# Patient Record
Sex: FEMALE | Race: BLACK OR AFRICAN AMERICAN | URBAN - METROPOLITAN AREA
[De-identification: names, ages, dates, MRNs, and addresses within clinical notes are randomized per-mention and may not be internally consistent; named-entity substitution may affect disease eponyms.]

---

## 2020-04-07 ENCOUNTER — VIRTUAL VISIT (OUTPATIENT)
Dept: INTERNAL MEDICINE CLINIC | Age: 40
End: 2020-04-07

## 2020-04-07 DIAGNOSIS — G89.29 CHRONIC BILATERAL LOW BACK PAIN WITH BILATERAL SCIATICA: ICD-10-CM

## 2020-04-07 DIAGNOSIS — M54.42 CHRONIC BILATERAL LOW BACK PAIN WITH BILATERAL SCIATICA: ICD-10-CM

## 2020-04-07 DIAGNOSIS — M77.12 LATERAL EPICONDYLITIS OF LEFT ELBOW: Primary | ICD-10-CM

## 2020-04-07 DIAGNOSIS — M54.41 CHRONIC BILATERAL LOW BACK PAIN WITH BILATERAL SCIATICA: ICD-10-CM

## 2020-04-07 DIAGNOSIS — L60.9 NAIL ABNORMALITY: ICD-10-CM

## 2020-04-08 ENCOUNTER — OFFICE VISIT (OUTPATIENT)
Dept: INTERNAL MEDICINE CLINIC | Age: 40
End: 2020-04-08

## 2020-04-08 VITALS
OXYGEN SATURATION: 98 % | RESPIRATION RATE: 16 BRPM | HEIGHT: 66 IN | TEMPERATURE: 98.1 F | SYSTOLIC BLOOD PRESSURE: 111 MMHG | HEART RATE: 66 BPM | WEIGHT: 210.6 LBS | DIASTOLIC BLOOD PRESSURE: 78 MMHG | BODY MASS INDEX: 33.85 KG/M2

## 2020-04-08 DIAGNOSIS — M54.41 CHRONIC BILATERAL LOW BACK PAIN WITH BILATERAL SCIATICA: Primary | ICD-10-CM

## 2020-04-08 DIAGNOSIS — G89.29 CHRONIC BILATERAL LOW BACK PAIN WITH BILATERAL SCIATICA: Primary | ICD-10-CM

## 2020-04-08 DIAGNOSIS — R00.1 BRADYCARDIA: ICD-10-CM

## 2020-04-08 DIAGNOSIS — M41.9 SCOLIOSIS OF LUMBAR SPINE, UNSPECIFIED SCOLIOSIS TYPE: ICD-10-CM

## 2020-04-08 DIAGNOSIS — M77.12 LATERAL EPICONDYLITIS OF LEFT ELBOW: ICD-10-CM

## 2020-04-08 DIAGNOSIS — B35.1 ONYCHOMYCOSIS: ICD-10-CM

## 2020-04-08 DIAGNOSIS — Z79.899 ENCOUNTER FOR LONG-TERM (CURRENT) USE OF OTHER MEDICATIONS: ICD-10-CM

## 2020-04-08 DIAGNOSIS — M54.30 SCIATIC NERVE PAIN, UNSPECIFIED LATERALITY: ICD-10-CM

## 2020-04-08 DIAGNOSIS — M54.42 CHRONIC BILATERAL LOW BACK PAIN WITH BILATERAL SCIATICA: Primary | ICD-10-CM

## 2020-04-08 PROBLEM — L60.9 NAIL ABNORMALITY: Status: ACTIVE | Noted: 2020-04-08

## 2020-04-08 RX ORDER — IBUPROFEN 200 MG
CAPSULE ORAL
COMMUNITY
End: 2020-04-08 | Stop reason: ALTCHOICE

## 2020-04-08 RX ORDER — NAPROXEN 500 MG/1
500 TABLET ORAL 2 TIMES DAILY WITH MEALS
Qty: 30 TAB | Refills: 0 | Status: SHIPPED | OUTPATIENT
Start: 2020-04-08 | End: 2020-05-26 | Stop reason: SDUPTHER

## 2020-04-08 NOTE — LETTER
4/8/2020 Ms. Briana Noguera [address] [id number] Dear Briana Noguera: It was nice meeting you in the office today! Your x-ray shows scoliosis of the spine which is a curvature of the spine that could potentially cause your symptoms. I plan to cancel the plan for a neurology visit and have you see an orthopedic doctor. Please find your most recent results below. Resulted Orders XR SPINE LUMB 2 OR 3 V (Exam End: 4/8/2020 10:23 AM) Narrative Lumbar spine 3 views dated 4/8/2020 History is back pain AP, lateral and spot lateral views of the lumbar spine were obtained. There is 
evidence of very mild lumbar scoliosis convex to the right. The lumbar vertebral 
bodies are of normal height and alignment. The intervertebral disc spaces are 
well-maintained. There is subtle concavity of the inferior endplates of L2 and L1. These appear to represent Schmorl's nodes. There is no evidence of 
spondylolisthesis. Impression IMPRESSION: 
Evidence of very mild lumbar scoliosis convex to the right. Please call me if you have any questions: [phone] Sincerely, Gerson Metcalf MD

## 2020-04-08 NOTE — PROGRESS NOTES
Consent: Thu Clay, who was seen by synchronous (real-time) audio-video technology, and/or her healthcare decision maker, is aware that this patient-initiated, Telehealth encounter on 2020 is a billable service, with coverage as determined by her insurance carrier. She is aware that she may receive a bill and has provided verbal consent to proceed: Yes. Assessment & Plan:   Diagnoses and all orders for this visit:    1. Lateral epicondylitis of left elbow. Tennis elbow brace and visit advised. 2. Chronic bilateral low back pain with bilateral sciatica. Visit advised. Advised to discontinue ibuprofen for now. 3. Toenail abnormality. Visit advised. I spent at least 20 minutes with this new patient, and >50% of the time was spent counseling and/or coordinating care regarding back pain, elbow pain and toenail discoloration  712  Subjective:   Thu Clay is a 42 y.o. female who was seen for Back Pain  Pt reports severe pain across the lower back for over 3 mo, most days of the week, with radiation into both legs. Legs go numb with sitting, left leg more so than right leg. Nothing makes pain better. No saddle anesthesia. No weakness, fever or urinary symptoms. . History or several epidurals. No trauma. Using several 800 mg ibuprofen at a time. No imaging or evaluation history. Left arm hurts. Pain starts at the elbow and radiates into the forearm. Pain is moderately severe. There has been no trauma. Using several 800 mg ibuprofen at a time. Not bracing. Toenails changed colors and feet hurt. Saw podiatry within past year and poor circulation was implicated. Legs and feet are not cold. Pt possibly had an ANTONIA. LMP 2018. Breastfeeding her infant.     PMH-back pain, elbow pain  PSH- rt oophorectomy; repair of 4th degree perineal tear during delivery  SH- single, denies alcohol or tobacco, employed by [employer]    Prior to Admission medications    Not on File     Not on File    Patient Active Problem List   Diagnosis Code    Chronic bilateral low back pain with bilateral sciatica M54.42, M54.41, G89.29    Nail abnormality L60.9       Not on File  History reviewed. No pertinent past medical history. History reviewed. No pertinent surgical history. History reviewed. No pertinent family history. Social History     Tobacco Use    Smoking status: Not on file   Substance Use Topics    Alcohol use: Not on file       Review of Systems   Constitutional: Negative for fever. HENT: Negative. Gastrointestinal: Negative for abdominal pain. Genitourinary: Negative for dysuria, frequency, hematuria and urgency. Musculoskeletal: Positive for back pain and myalgias. Negative for falls. Skin: Negative. Neurological: Negative for focal weakness. Objective:   Vital Signs: (As obtained by patient/caregiver at home)  There were no vitals taken for this visit.      [INSTRUCTIONS:  \"[x]\" Indicates a positive item  \"[]\" Indicates a negative item  -- DELETE ALL ITEMS NOT EXAMINED]    Constitutional: [x] Appears well-developed and well-nourished [x] No apparent distress      [] Abnormal -     Mental status: [x] Alert and awake  [x] Oriented to person/place/time [x] Able to follow commands    [] Abnormal -     Eyes:   EOM    [x]  Normal    [] Abnormal -   Sclera  [x]  Normal    [] Abnormal -          Discharge [x]  None visible   [] Abnormal -     HENT: [x] Normocephalic, atraumatic  [] Abnormal -   [x] Mouth/Throat: Mucous membranes are moist    External Ears [x] Normal  [] Abnormal -    Neck: [x] No visualized mass [] Abnormal -     Pulmonary/Chest: [x] Respiratory effort normal   [x] No visualized signs of difficulty breathing or respiratory distress        [] Abnormal -      Musculoskeletal:   [x] Normal gait with no signs of ataxia         [x] Normal range of motion of neck        [] Abnormal -     Neurological:        [x] No Facial Asymmetry (Cranial nerve 7 motor function) (limited exam due to video visit)          [x] No gaze palsy        [] Abnormal -          Skin:        [x] No significant exanthematous lesions or discoloration noted on facial skin         [] Abnormal -            Psychiatric:       [x] Normal Affect [] Abnormal -        [x] No Hallucinations    Other pertinent observable physical exam findings:-        We discussed the expected course, resolution and complications of the diagnosis(es) in detail. Medication risks, benefits, costs, interactions, and alternatives were discussed as indicated. I advised her to contact the office if her condition worsens, changes or fails to improve as anticipated. She expressed understanding with the diagnosis(es) and plan. Addi Burkett is a 42 y.o. female being evaluated by a video visit encounter for concerns as above. A caregiver was present when appropriate. Due to this being a TeleHealth encounter (During SVYWW-04 public health emergency), evaluation of the following organ systems was limited: Vitals/Constitutional/EENT/Resp/CV/GI//MS/Neuro/Skin/Heme-Lymph-Imm. Pursuant to the emergency declaration under the [facility], 1135 waiver authority and the [employer] Act, this Virtual  Visit was conducted, with patient's (and/or legal guardian's) consent, to reduce the patient's risk of exposure to COVID-19 and provide necessary medical care. Services were provided through a video synchronous discussion virtually to substitute for in-person clinic visit. Patient and provider were located at their individual homes.         Sara Corona MD

## 2020-04-08 NOTE — PATIENT INSTRUCTIONS
X-Ray: About This Test 
What is it? An X-ray is a picture of the inside of your body. Depending on the part of your body to be X-rayed, the X-ray may show bones, organs, foreign objects, or pockets of air or fluid. Any part of your body can be X-rayed, including your head, chest, belly, arms, and legs. Why is this test done? Doctors use X-rays to help find out what's wrong or whether there is a problem, what is causing pain, or where a foreign object may be located in your body. X-rays can also help check the position of a tube or device you've had put in your body. Examples may include a gastrostomy tube, a port, or a stent. How do you prepare for the test? 
· In general, there's nothing you have to do before this test, unless your doctor tells you to. How is the test done? · You will need to hold very still while the X-ray is taken. A padded brace, foam pads, a headband, or sandbags may be used to hold your body in place while the pictures are taken, depending on what part of your body is being X-rayed. · More than one X-ray view may be taken. How long does the test take? · The test will take about 5 to 20 minutes, depending on the part of your body being X-rayed. You might be asked to stay longer if a picture needs to be retaken. What happens after the test? 
· You will probably be able to go home right away. It depends on the reason for the test. 
· You can go back to your usual activities right away. Follow-up care is a key part of your treatment and safety. Be sure to make and go to all appointments, and call your doctor if you are having problems. It's also a good idea to keep a list of the medicines you take. Ask your doctor when you can expect to have your test results. Where can you learn more? Go to http://lavinia-joanne.info/ Enter S146 in the search box to learn more about \"X-Ray: About This Test.\" Current as of: December 8, 2019Content Version: 12.4 © 7764-1540 [employer]. Care instructions adapted under license by [employer] (which disclaims liability or warranty for this information). If you have questions about a medical condition or this instruction, always ask your healthcare professional. [address] any warranty or liability for your use of this information. Sciatica: Care Instructions Your Care Instructions Sciatica (say \"pqe-QR-um-kuh\") is an irritation of one of the sciatic nerves, which come from the spinal cord in the lower back. The sciatic nerves and their branches extend down through the buttock to the foot. Sciatica can develop when an injured disc in the back presses against a spinal nerve root. Its main symptom is pain, numbness, or weakness that is often worse in the leg or foot than in the back. Sciatica often will improve and go away with time. Early treatment usually includes medicines and exercises to relieve pain. Follow-up care is a key part of your treatment and safety. Be sure to make and go to all appointments, and call your doctor if you are having problems. It's also a good idea to know your test results and keep a list of the medicines you take. How can you care for yourself at home? · Take pain medicines exactly as directed. ? If the doctor gave you a prescription medicine for pain, take it as prescribed. ? If you are not taking a prescription pain medicine, ask your doctor if you can take an over-the-counter medicine. · Use heat or ice to relieve pain. ? To apply heat, put a warm water bottle, heating pad set on low, or warm cloth on your back. Do not go to sleep with a heating pad on your skin. ? To use ice, put ice or a cold pack on the area for 10 to 20 minutes at a time. Put a thin cloth between the ice and your skin. · Avoid sitting if possible, unless it feels better than standing. · Alternate lying down with short walks. Increase your walking distance as you are able to without making your symptoms worse. · Do not do anything that makes your symptoms worse. When should you call for help? Call 911 anytime you think you may need emergency care. For example, call if: 
  · You are unable to move a leg at all.  
[facility] your doctor now or seek immediate medical care if: 
  · You have new or worse symptoms in your legs or buttocks. Symptoms may include: 
? Numbness or tingling. ? Weakness. ? Pain.  
  · You lose bladder or bowel control.  
 Watch closely for changes in your health, and be sure to contact your doctor if: 
  · You are not getting better as expected. Where can you learn more? Go to http://lavinia-joanne.info/ Enter [phone] in the search box to learn more about \"Sciatica: Care Instructions. \" Current as of: June 26, 2019Content Version: 12.4 © 9006-9479 [employer]. Care instructions adapted under license by [employer] (which disclaims liability or warranty for this information). If you have questions about a medical condition or this instruction, always ask your healthcare professional. [place] [address] any warranty or liability for your use of this information. Sciatica: Exercises Introduction Here are some examples of typical rehabilitation exercises for your condition. Start each exercise slowly. Ease off the exercise if you start to have pain. Your doctor or physical therapist will tell you when you can start these exercises and which ones will work best for you. When you are not being active, find a comfortable position for rest. Some people are comfortable on the floor or a medium-firm bed with a small pillow under their head and another under their knees. Some people prefer to lie on their side with a pillow between their knees. Don't stay in one position for too long. Take short walks (10 to 20 minutes) every 2 to 3 hours. Avoid slopes, hills, and stairs until you feel better. Walk only distances you can manage without pain, especially leg pain. How to do the exercises Back stretches 1. Get down on your hands and knees on the floor. 2. Relax your head and allow it to droop. Round your back up toward the ceiling until you feel a nice stretch in your upper, middle, and lower back. Hold this stretch for as long as it feels comfortable, or about 15 to 30 seconds. 3. Return to the starting position with a flat back while you are on your hands and knees. 4. Let your back sway by pressing your stomach toward the floor. Lift your buttocks toward the ceiling. 5. Hold this position for 15 to 30 seconds. 6. Repeat 2 to 4 times. Follow-up care is a key part of your treatment and safety. Be sure to make and go to all appointments, and call your doctor if you are having problems. It's also a good idea to know your test results and keep a list of the medicines you take. Where can you learn more? Go to http://laviniaZadspacejoanne.info/ Enter Y446 in the search box to learn more about \"Sciatica: Exercises. \" Current as of: June 26, 2019Content Version: 12.4 © 2142-7402 [employer]. Care instructions adapted under license by [employer] (which disclaims liability or warranty for this information). If you have questions about a medical condition or this instruction, always ask your healthcare professional. [place] [address] any warranty or liability for your use of this information. Naproxen (By mouth) Naproxen (na-PROX-en) Treats fever and pain. Also treats arthritis, gout, and menstrual cramps or pain. This medicine is an NSAID. Brand Name(s): Aleve, Aleve Arthritis, All Day Pain Relief, All Day Relief, Anaprox, Anaprox DS, EC Naprosyn, Flanax Pain Relief, Flanax Pain Relief Kit, [facility] All Day Pain Relief, Good Sense All Day Pain Relief, Good Sense Naproxen Sodium, Leader All Day Pain Relief, Mediproxen, Naprelan There may be other brand names for this medicine. When This Medicine Should Not Be Used: This medicine is not right for everyone. Do not use it if you had an allergic reaction (including asthma) to naproxen, aspirin, or other NSAIDs. Do not use it if you have had a heart surgery (such as coronary artery bypass graft). How to Use This Medicine:  
Liquid Filled Capsule, Liquid, Tablet, Coated Tablet, Long Acting Tablet · Your doctor will tell you how much medicine to use. Do not use more than directed. · Take this medicine with food or milk so it does not upset your stomach. Drink a full glass of water after each dose. · Delayed-release tablet: Swallow whole. Do not crush, break, or chew it. · Oral liquid: Shake well just before you measure the dose. Measure the oral liquid medicine with a marked measuring spoon, oral syringe, or medicine cup. · Follow the instructions on the medicine label if you are using this medicine without a prescription. · This medicine should come with a Medication Guide. Ask your pharmacist for a copy if you do not have one. · Missed dose: Take a dose as soon as you remember. If it is almost time for your next dose, wait until then and take a regular dose. Do not take extra medicine to make up for a missed dose. · Store the medicine in a closed container at room temperature, away from heat, moisture, and direct light. Oral liquid: Do not freeze. Drugs and Foods to Avoid: Ask your doctor or pharmacist before using any other medicine, including over-the-counter medicines, vitamins, and herbal products. · Do not use any other NSAID medicine unless your doctor says it is okay. Some other NSAIDs are aspirin, celecoxib, diclofenac, diflunisal, ibuprofen, or salsalate. · Some medicines can affect how naproxen works. Tell your doctor if you are using any of the following: ¨ Cholestyramine, cyclosporine, digoxin, lithium, methotrexate, probenecid, sucralfate ¨ Antacids ¨ Blood pressure medicine ¨ Blood thinner (including warfarin) ¨ Diuretic (water pill) ¨ Medicine to treat depression Ed.Ax Steroid medicine · Do not drink alcohol while you are using this medicine. Warnings While Using This Medicine: · Tell your doctor if you are pregnant or breastfeeding. Do not use this medicine during the later part of a pregnancy, unless your doctor tells you to. · Tell your doctor if you have kidney disease, liver disease, anemia, asthma, bleeding problems, high blood pressure, heart failure, a recent heart attack, or a history of stomach or bowel problems (including ulcers or bleeding). Tell your doctor if you smoke or drink alcohol. · This medicine may cause the following problems: 
¨ Higher risk of blood clots, heart attack, stroke, or heart failure ¨ Bleeding and ulcers in the stomach or intestines ¨ Liver damage ¨ Kidney damage ¨ High potassium levels in the blood ¨ Serious skin reactions · Call your doctor if symptoms get worse, pain lasts more than 10 days, or fever lasts more than 3 days. · Tell any doctor or dentist who treats that you are using this medicine, especially if you have surgery or a procedure. · This medicine may make you dizzy or drowsy. Do not drive or do anything else that could be dangerous until you know how this medicine affects you. · Ovulation may be delayed in some women while this medicine is being used. Talk to your doctor if you have concerns about this. · Tell any doctor or dentist who treats you that you are using this medicine. This medicine may affect certain medical test results. · Your doctor will do lab tests at regular visits to check on the effects of this medicine. Keep all appointments. · Keep all medicine out of the reach of children. Never share your medicine with anyone. Possible Side Effects While Using This Medicine:  
Call your doctor right away if you notice any of these side effects: · Allergic reaction: Itching or hives, swelling in your face or hands, swelling or tingling in your mouth or throat, chest tightness, trouble breathing · Blistering, peeling, red skin rash · Bloody or black, tarry stools, severe stomach pain, vomiting blood or something that looks like coffee grounds · Change in how much or how often you urinate · Chest pain that may spread, trouble breathing, unusual sweating, fainting · Dark urine or pale stools, nausea, vomiting, loss of appetite, stomach pain, yellow skin or eyes · Numbness or weakness on one side of your body, sudden or severe headache, problems with vision, speech, or walking · Rapid weight gain, swelling in your hands, ankles, or feet · Unusual bleeding, bruising, or weakness · Vision changes If you notice these less serious side effects, talk with your doctor: · Mild nausea, diarrhea, or constipation · Ringing in your ears, dizziness, headache If you notice other side effects that you think are caused by this medicine, tell your doctor. Call your doctor for medical advice about side effects. You may report side effects to FDA at 4-591-FDA-3317 © 2017 [address] Information is for End User's use only and may not be sold, redistributed or otherwise used for commercial purposes. The above information is an  only. It is not intended as medical advice for individual conditions or treatments. Talk to your doctor, nurse or pharmacist before following any medical regimen to see if it is safe and effective for you.

## 2020-04-08 NOTE — PROGRESS NOTES
Chief Complaint   Patient presents with    Back Pain     Patient in office with complaints of back pain and numbness in left leg, foot, and arm. Patient states that she has noticed necrosis of her toenails. 1. Have you been to the ER, urgent care clinic since your last visit? Hospitalized since your last visit? No    2. Have you seen or consulted any other health care providers outside of the [address] [place] [address] since your last visit? Include any pap smears or colon screening.  No

## 2020-04-08 NOTE — PROGRESS NOTES
SPORTS MEDICINE AND PRIMARY CARE  Torie Lara. MD Shefali  [address] [place] [id number]    Chief Complaint   Patient presents with    Back Pain     Patient in office with complaints of back pain and numbness in left leg, foot, and arm. Patient states that she has noticed necrosis of her toenails. SUBJECTIVE:    Pierce Jones is a 42 y.o. female for back pain evaluation. Pt reports severe (10/10), progressively worsening pain across the lower back for over 3 mo, most days of the week, with radiation into anterolateral aspects of both legs. Legs go numb with sitting, left leg more so than right leg. Nothing makes pain better. No saddle anesthesia. No weakness, fever or urinary symptoms. . History of several epidurals. No trauma. Using several 200 mg ibuprofen at a time a few times a day. No imaging or evaluation history.     Left arm hurts at elbow. Pain  radiates into the forearm. Pain is moderately severe.  is maintained. There has been no trauma. Using several 200 mg ibuprofen at a time. Not bracing.     Toenails changed colors and are no longer flat and smooth. Saw podiatry within past year and poor circulation was implicated. Legs and feet are not cold. Pt possibly had an ANTONIA.      LMP 2018. Breastfeeding her infant. Current Outpatient Medications   Medication Sig Dispense Refill                   History reviewed. No pertinent past medical history.   Past Surgical History:   Procedure Laterality Date    HX GYN      \"twisted\" right ovary     Allergies   Allergen Reactions    Shellfish Derived Hives and Swelling       REVIEW OF SYSTEMS:  General: negative for - chills or fever  ENT: negative for - headaches, nasal congestion or tinnitus  Respiratory: negative for - cough, hemoptysis, shortness of breath or wheezing  Cardiovascular : negative for - chest pain, edema, palpitations or shortness of breath  Gastrointestinal: negative for - abdominal pain, blood in stools, heartburn or nausea/vomiting  Genito-Urinary: no dysuria, trouble voiding, or hematuria  Musculoskeletal: + joint pain, joint stiffness; negative for - gait disturbance, or joint swelling  Neurological: leg paresthesias  Hematologic: no bruises, no bleeding, no swollen glands  Integument: +nail changes; no lumps, mole changes,  or rash  Endocrine:no malaise/lethargy or unexpected weight changes      Social History     Socioeconomic History    Marital status: SINGLE     Spouse name: Not on file    Number of children: Not on file    Years of education: Not on file    Highest education level: Not on file   Tobacco Use    Smoking status: Never Smoker    Smokeless tobacco: Never Used   Substance and Sexual Activity    Alcohol use: Not Currently    Drug use: Never    Sexual activity: Yes     Partners: Male     Birth control/protection: Condom     Family History   Problem Relation Age of Onset    Hypertension Mother     Kidney Disease Mother        OBJECTIVE:     Visit Vitals  /78   Pulse 66   Temp 98.1 °F (36.7 °C) (Oral)   Resp 16   Ht 5' 6\" (1.676 m)   Wt 210 lb 9.6 oz (95.5 kg)   LMP 11/08/2018   SpO2 98%   BMI 33.99 kg/m²     CONSTITUTIONAL: well , well nourished, appears age appropriate  EYES: perrla, eom intact  ENMT:moist mucous membranes, pharynx clear  NECK: supple. Thyroid normal  RESPIRATORY: Chest: clear bilaterally  CARDIOVASCULAR: Heart: regular rate and rhythm  GASTROINTESTINAL: Abdomen: soft, bowel sounds active  HEMATOLOGIC: no pathological lymph nodes palpated  MUSCULOSKELETAL: Lt elbow is tender laterally, ROM is intact, no jt warmth or erythema Back: no list observed, no spinal or paraspinal tenderness palpated, ROM at lumbar spine is painful, pt leans forward during examExtremities: no edema, pulse 1+   INTEGUMENT: No unusual rashes or suspicious skin lesions noted.  Nails appear normal.  NEUROLOGIC: non-focal exam  LE motor and sensory function-grossly intact, LE reflexes are grossly intact  MENTAL STATUS: alert and oriented, appropriate affect     Office Visit on 04/08/2020   Component Date Value Ref Range Status    Specific Gravity 04/08/2020      >=1.030* 1.005 - 1.030 Final    pH (UA) 04/08/2020 5.5  5.0 - 7.5 Final    Color 04/08/2020 Yellow  Yellow Final    Appearance 04/08/2020 Clear  Clear Final    Leukocyte Esterase 04/08/2020 Negative  Negative Final    Protein 04/08/2020 Negative  Negative/Trace Final    Glucose 04/08/2020 Negative  Negative Final    Ketone 04/08/2020 Negative  Negative Final    Blood 04/08/2020 Negative  Negative Final    Bilirubin 04/08/2020 Negative  Negative Final    Urobilinogen 04/08/2020 0.2  0.2 - 1.0 mg/dL Final    Nitrites 04/08/2020 Negative  Negative Final    Microscopic Examination 04/08/2020 Comment   Final    Microscopic follows if indicated.  Microscopic exam 04/08/2020 See additional order   Final    Microscopic was indicated and was performed.  URINALYSIS REFLEX 04/08/2020 Comment   Final    This specimen will not reflex to a Urine Culture.     Hemoglobin A1c 04/08/2020 5.3  4.8 - 5.6 % Final    Comment:          Prediabetes: 5.7 - 6.4           Diabetes: >6.4           Glycemic control for adults with diabetes: <7.0      Estimated average glucose 04/08/2020 105  mg/dL Final    WBC 04/08/2020 4.7  3.4 - 10.8 x10E3/uL Final    RBC 04/08/2020 4.46  3.77 - 5.28 x10E6/uL Final    HGB 04/08/2020 12.1  11.1 - 15.9 g/dL Final    HCT 04/08/2020 38.0  34.0 - 46.6 % Final    MCV 04/08/2020 85  79 - 97 fL Final    MCH 04/08/2020 27.1  26.6 - 33.0 pg Final    MCHC 04/08/2020 31.8  31.5 - 35.7 g/dL Final    RDW 04/08/2020 12.8  11.7 - 15.4 % Final    PLATELET 26/76/7034 882  150 - 450 x10E3/uL Final    NEUTROPHILS 04/08/2020 45  Not Estab. % Final    Lymphocytes 04/08/2020 41  Not Estab. % Final    MONOCYTES 04/08/2020 10  Not Estab. % Final    EOSINOPHILS 04/08/2020 3  Not Estab. % Final    BASOPHILS 04/08/2020 1  Not Estab. % Final    ABS. NEUTROPHILS 04/08/2020 2.1  1.4 - 7.0 x10E3/uL Final    Abs Lymphocytes 04/08/2020 1.9  0.7 - 3.1 x10E3/uL Final    ABS. MONOCYTES 04/08/2020 0.5  0.1 - 0.9 x10E3/uL Final    ABS. EOSINOPHILS 04/08/2020 0.2  0.0 - 0.4 x10E3/uL Final    ABS. BASOPHILS 04/08/2020 0.0  0.0 - 0.2 x10E3/uL Final    IMMATURE GRANULOCYTES 04/08/2020 0  Not Estab. % Final    ABS. IMM. GRANS. 04/08/2020 0.0  0.0 - 0.1 x10E3/uL Final    Glucose 04/08/2020 104* 65 - 99 mg/dL Final    BUN 04/08/2020 12  6 - 20 mg/dL Final    Creatinine 04/08/2020 0.92  0.57 - 1.00 mg/dL Final    GFR est non-AA 04/08/2020 80  >59 mL/min/1.73 Final    GFR est AA 04/08/2020 92  >59 mL/min/1.73 Final    BUN/Creatinine ratio 04/08/2020 13  9 - 23 Final    Sodium 04/08/2020 140  134 - 144 mmol/L Final    Potassium 04/08/2020 4.6  3.5 - 5.2 mmol/L Final    Chloride 04/08/2020 103  96 - 106 mmol/L Final    CO2 04/08/2020 22  20 - 29 mmol/L Final    Calcium 04/08/2020 9.6  8.7 - 10.2 mg/dL Final    Protein, total 04/08/2020 7.3  6.0 - 8.5 g/dL Final    Albumin 04/08/2020 4.7  3.8 - 4.8 g/dL Final    GLOBULIN, TOTAL 04/08/2020 2.6  1.5 - 4.5 g/dL Final    A-G Ratio 04/08/2020 1.8  1.2 - 2.2 Final    Bilirubin, total 04/08/2020 0.3  0.0 - 1.2 mg/dL Final    Alk. phosphatase 04/08/2020 82  39 - 117 IU/L Final    AST (SGOT) 04/08/2020 17  0 - 40 IU/L Final    ALT (SGPT) 04/08/2020 13  0 - 32 IU/L Final    Sed rate (ESR) 04/08/2020 15  0 - 32 mm/hr Final    TSH 04/08/2020 1.090  0.450 - 4.500 uIU/mL Final    WBC 04/08/2020 0-5  0 - 5 /hpf Final    RBC 04/08/2020 0-2  0 - 2 /hpf Final    Epithelial cells 04/08/2020 0-10  0 - 10 /hpf Final    Casts 04/08/2020 None seen  None seen /lpf Final    Mucus 04/08/2020 Present  Not Estab. Final    Bacteria 04/08/2020 Few  None seen/Few Final       ASSESSMENT:   1. Chronic bilateral low back pain with bilateral sciatica    2. Lateral epicondylitis of left elbow    3.  Bradycardia  Rule out hypothyroidism   4. Encounter for long-term (current) use of other medications    5. BMI 33.0-33.9,adult    6. Onychomycosis    7. Scoliosis of lumbar spine, unspecified scoliosis type    8. Sciatic nerve pain, unspecified laterality        I have discussed the diagnosis with the patient and the intended plan as seen in the  orders. The patient understands and agees with the plan. The patient has   received an after visit summary and questions were answered concerning  future plans  Patient labs and/or xrays were reviewed  Past records were reviewed. PLAN:  .  Orders Placed This Encounter    AMB SUPPLY ORDER- left tennis elbow brace    XR SPINE LUMB 2 OR 3 V    URINALYSIS W/ REFLEX CULTURE    HEMOGLOBIN A1C WITH EAG    CBC WITH AUTOMATED DIFF    METABOLIC PANEL, COMPREHENSIVE    SED RATE (ESR)    TSH REFLEX TO T4    MICROSCOPIC EXAMINATION    REFERRAL TO PODIATRY    REFERRAL TO ORTHOPEDICS    DISCONTD: ibuprofen 200 mg cap    naproxen (NAPROSYN) 500 mg tablet bid #30    predniSONE (STERAPRED DS) 10 mg dose pack   Counseled regarding diet, exercise and healthy lifestyle    Follow-up and Dispositions    · Return in about 4 weeks (around 5/6/2020), or physical.       A total of at least 40 min was spent during this evaluation of which half was spent in counseling and care coordination.

## 2020-04-08 NOTE — LETTER
NOTIFICATION RETURN TO WORK / SCHOOL 
 
4/8/2020 10:49 AM 
 
Ms. Precious Harrison [address] [id number] To Whom It May Concern: 
 
Precious Harrison is currently under the care of Jordan Cummings. She will return to work/school on 04/08/2020. If there are questions or concerns please have the patient contact our office. Sincerely, Jeanne Ordoñez MD

## 2020-04-09 LAB
ALBUMIN SERPL-MCNC: 4.7 G/DL (ref 3.8–4.8)
ALBUMIN/GLOB SERPL: 1.8 {RATIO} (ref 1.2–2.2)
ALP SERPL-CCNC: 82 IU/L (ref 39–117)
ALT SERPL-CCNC: 13 IU/L (ref 0–32)
APPEARANCE UR: CLEAR
AST SERPL-CCNC: 17 IU/L (ref 0–40)
BACTERIA #/AREA URNS HPF: NORMAL /[HPF]
BASOPHILS # BLD AUTO: 0 X10E3/UL (ref 0–0.2)
BASOPHILS NFR BLD AUTO: 1 %
BILIRUB SERPL-MCNC: 0.3 MG/DL (ref 0–1.2)
BILIRUB UR QL STRIP: NEGATIVE
BUN SERPL-MCNC: 12 MG/DL (ref 6–20)
BUN/CREAT SERPL: 13 (ref 9–23)
CALCIUM SERPL-MCNC: 9.6 MG/DL (ref 8.7–10.2)
CASTS URNS QL MICRO: NORMAL /LPF
CHLORIDE SERPL-SCNC: 103 MMOL/L (ref 96–106)
CO2 SERPL-SCNC: 22 MMOL/L (ref 20–29)
COLOR UR: YELLOW
CREAT SERPL-MCNC: 0.92 MG/DL (ref 0.57–1)
EOSINOPHIL # BLD AUTO: 0.2 X10E3/UL (ref 0–0.4)
EOSINOPHIL NFR BLD AUTO: 3 %
EPI CELLS #/AREA URNS HPF: NORMAL /HPF (ref 0–10)
ERYTHROCYTE [DISTWIDTH] IN BLOOD BY AUTOMATED COUNT: 12.8 % (ref 11.7–15.4)
ERYTHROCYTE [SEDIMENTATION RATE] IN BLOOD BY WESTERGREN METHOD: 15 MM/HR (ref 0–32)
EST. AVERAGE GLUCOSE BLD GHB EST-MCNC: 105 MG/DL
GLOBULIN SER CALC-MCNC: 2.6 G/DL (ref 1.5–4.5)
GLUCOSE SERPL-MCNC: 104 MG/DL (ref 65–99)
GLUCOSE UR QL: NEGATIVE
HBA1C MFR BLD: 5.3 % (ref 4.8–5.6)
HCT VFR BLD AUTO: 38 % (ref 34–46.6)
HGB BLD-MCNC: 12.1 G/DL (ref 11.1–15.9)
HGB UR QL STRIP: NEGATIVE
IMM GRANULOCYTES # BLD AUTO: 0 X10E3/UL (ref 0–0.1)
IMM GRANULOCYTES NFR BLD AUTO: 0 %
KETONES UR QL STRIP: NEGATIVE
LEUKOCYTE ESTERASE UR QL STRIP: NEGATIVE
LYMPHOCYTES # BLD AUTO: 1.9 X10E3/UL (ref 0.7–3.1)
LYMPHOCYTES NFR BLD AUTO: 41 %
MCH RBC QN AUTO: 27.1 PG (ref 26.6–33)
MCHC RBC AUTO-ENTMCNC: 31.8 G/DL (ref 31.5–35.7)
MCV RBC AUTO: 85 FL (ref 79–97)
MICRO URNS: ABNORMAL
MICRO URNS: ABNORMAL
MONOCYTES # BLD AUTO: 0.5 X10E3/UL (ref 0.1–0.9)
MONOCYTES NFR BLD AUTO: 10 %
MUCOUS THREADS URNS QL MICRO: PRESENT
NEUTROPHILS # BLD AUTO: 2.1 X10E3/UL (ref 1.4–7)
NEUTROPHILS NFR BLD AUTO: 45 %
NITRITE UR QL STRIP: NEGATIVE
PH UR STRIP: 5.5 [PH] (ref 5–7.5)
PLATELET # BLD AUTO: 270 X10E3/UL (ref 150–450)
POTASSIUM SERPL-SCNC: 4.6 MMOL/L (ref 3.5–5.2)
PROT SERPL-MCNC: 7.3 G/DL (ref 6–8.5)
PROT UR QL STRIP: NEGATIVE
RBC # BLD AUTO: 4.46 X10E6/UL (ref 3.77–5.28)
RBC #/AREA URNS HPF: NORMAL /HPF (ref 0–2)
SODIUM SERPL-SCNC: 140 MMOL/L (ref 134–144)
SP GR UR: >=1.03 (ref 1–1.03)
TSH SERPL DL<=0.005 MIU/L-ACNC: 1.09 UIU/ML (ref 0.45–4.5)
URINALYSIS REFLEX, 377202: ABNORMAL
UROBILINOGEN UR STRIP-MCNC: 0.2 MG/DL (ref 0.2–1)
WBC # BLD AUTO: 4.7 X10E3/UL (ref 3.4–10.8)
WBC #/AREA URNS HPF: NORMAL /HPF (ref 0–5)

## 2020-04-09 RX ORDER — PREDNISONE 10 MG/1
TABLET ORAL
Qty: 21 TAB | Refills: 0 | Status: SHIPPED | OUTPATIENT
Start: 2020-04-09 | End: 2020-05-26

## 2020-05-04 ENCOUNTER — VIRTUAL VISIT (OUTPATIENT)
Dept: INTERNAL MEDICINE CLINIC | Age: 40
End: 2020-05-04

## 2020-05-04 NOTE — PROGRESS NOTES
Chief Complaint   Patient presents with   [facility] Follow Up     This encounter was created in error - please disregard.

## 2020-05-26 ENCOUNTER — VIRTUAL VISIT (OUTPATIENT)
Dept: INTERNAL MEDICINE CLINIC | Age: 40
End: 2020-05-26

## 2020-05-26 DIAGNOSIS — M54.42 CHRONIC BILATERAL LOW BACK PAIN WITH BILATERAL SCIATICA: ICD-10-CM

## 2020-05-26 DIAGNOSIS — M54.41 CHRONIC BILATERAL LOW BACK PAIN WITH BILATERAL SCIATICA: ICD-10-CM

## 2020-05-26 DIAGNOSIS — M41.9 SCOLIOSIS OF LUMBAR SPINE, UNSPECIFIED SCOLIOSIS TYPE: ICD-10-CM

## 2020-05-26 DIAGNOSIS — M48.062 SPINAL STENOSIS OF LUMBAR REGION WITH NEUROGENIC CLAUDICATION: Primary | ICD-10-CM

## 2020-05-26 DIAGNOSIS — G89.29 CHRONIC BILATERAL LOW BACK PAIN WITH BILATERAL SCIATICA: ICD-10-CM

## 2020-05-26 DIAGNOSIS — R11.2 NON-INTRACTABLE VOMITING WITH NAUSEA, UNSPECIFIED VOMITING TYPE: ICD-10-CM

## 2020-05-26 DIAGNOSIS — G89.29 OTHER CHRONIC PAIN: ICD-10-CM

## 2020-05-26 RX ORDER — GABAPENTIN 300 MG/1
CAPSULE ORAL
Qty: 90 CAP | Refills: 0 | Status: SHIPPED | OUTPATIENT
Start: 2020-05-26

## 2020-05-26 RX ORDER — NAPROXEN 500 MG/1
500 TABLET ORAL 2 TIMES DAILY WITH MEALS
Qty: 30 TAB | Refills: 0 | Status: SHIPPED | OUTPATIENT
Start: 2020-05-26

## 2020-05-26 RX ORDER — CYCLOBENZAPRINE HCL 5 MG
TABLET ORAL
Qty: 30 TAB | Refills: 0 | Status: SHIPPED | OUTPATIENT
Start: 2020-05-26

## 2020-05-26 NOTE — PROGRESS NOTES
Shelly Albrecht is a 42 y.o. female who was seen by synchronous (real-time) audio-video technology on 5/26/2020. Consent: Shelly Albrecht, who was seen by synchronous (real-time) audio-video technology, and/or her healthcare decision maker, is aware that this patient-initiated, Telehealth encounter on 5/26/2020 is a billable service, with coverage as determined by her insurance carrier. She is aware that she may receive a bill and has provided verbal consent to proceed: Yes. Assessment & Plan:   Diagnoses and all orders for this visit:    1. Spinal stenosis of lumbar region with neurogenic claudication-status post Ortho evaluation and plan for injection therapy and pain management    2. Chronic bilateral low back pain with bilateral sciatica  -     gabapentin (NEURONTIN) 300 mg capsule; Begin taking 1 po hs x 3 nights then increase to 1 po bid x 3 days then take 1 po 8 hr apart for chronic pain  -     naproxen (NAPROSYN) 500 mg tablet; Take 1 Tab by mouth two (2) times daily (with meals). Use for pain and inflammation  -     cyclobenzaprine (FLEXERIL) 5 mg tablet; 1 po hs to relax muscles. Increase to 1 po 8 hr apart prn    3. Scoliosis of lumbar spine, unspecified scoliosis type-plan is for as needed NSAIDs and gabapentin    4. Other chronic pain    5. Nausea and vomiting secondary to meloxicam.  Stop medication and reinitiate Naprosyn. Medication Side Effects and Warnings were discussed with patient,  Patient Labs were reviewed and or requested, and  Patient Past Records were reviewed and or requested       OFFICE VISIT 2 WEEKS- MED FOLLOW UP      Subjective:   Shelly Albrecht is a 42 y.o. female who was seen for Back Pain    Patient is following up on chronic low back pain. Ortho ordered lumbar spine MRI which showed spinal stenosis. Earlier she had x-rays of her lumbar spine that showed a mild lumbar scoliosis. Pain is daily and severe involving the lower back and lower extremities.   Patient will be contacting pain management facilities for an appointment. She took Mobic prescribed by orthopedics but yet but had nausea and vomiting recurrently. She will go back to using Naprosyn. She asks for other pain medications because Naprosyn was not effective alone. Patient reports that she stopped breast-feeding because she realized it was affecting pain management therapy. Prior to Admission medications    Medication Sig Start Date End Date Taking? Authorizing Provider   gabapentin (NEURONTIN) 300 mg capsule Begin taking 1 po hs x 3 nights then increase to 1 po bid x 3 days then take 1 po 8 hr apart for chronic pain 5/26/20  Yes Jace Crespo MD   naproxen (NAPROSYN) 500 mg tablet Take 1 Tab by mouth two (2) times daily (with meals). Use for pain and inflammation 5/26/20  Yes Jace Crespo MD   cyclobenzaprine (FLEXERIL) 5 mg tablet 1 po hs to relax muscles. Increase to 1 po 8 hr apart prn 5/26/20  Yes Jace Crespo MD   predniSONE [facility] DS) 10 mg dose pack See administration instruction per 10mg dose pack 4/9/20 5/26/20  Jace Crespo MD   naproxen (NAPROSYN) 500 mg tablet Take 1 Tab by mouth two (2) times daily (with meals).  Use for pain and inflammation 4/8/20 5/26/20  Jace Crespo MD     Allergies   Allergen Reactions    Shellfish Derived Hives and Swelling    Mobic [Meloxicam] Nausea and Vomiting       Patient Active Problem List   Diagnosis Code    Chronic bilateral low back pain with bilateral sciatica M54.42, M54.41, G89.29    Nail abnormality L60.9     Patient Active Problem List    Diagnosis Date Noted    Nail abnormality 04/08/2020    Chronic bilateral low back pain with bilateral sciatica 04/07/2020     Current Outpatient Medications   Medication Sig Dispense Refill    gabapentin (NEURONTIN) 300 mg capsule Begin taking 1 po hs x 3 nights then increase to 1 po bid x 3 days then take 1 po 8 hr apart for chronic pain 90 Cap 0    naproxen (NAPROSYN) 500 mg tablet Take 1 Tab by mouth two (2) times daily (with meals). Use for pain and inflammation 30 Tab 0    cyclobenzaprine (FLEXERIL) 5 mg tablet 1 po hs to relax muscles. Increase to 1 po 8 hr apart prn 30 Tab 0     Allergies   Allergen Reactions    Shellfish Derived Hives and Swelling    Mobic [Meloxicam] Nausea and Vomiting     Past Medical History:   Diagnosis Date    Chronic pain     Lumbar scoliosis 05/25/2020     Past Surgical History:   Procedure Laterality Date    HX GYN      \"twisted\" right ovary     Family History   Problem Relation Age of Onset    Hypertension Mother     Kidney Disease Mother        Review of Systems   Gastrointestinal: Positive for nausea and vomiting. Musculoskeletal: Positive for back pain. Neurological: Positive for sensory change. Objective:   Vital Signs: (As obtained by patient/caregiver at home)  There were no vitals taken for this visit.      [INSTRUCTIONS:  \"[x]\" Indicates a positive item  \"[]\" Indicates a negative item  -- DELETE ALL ITEMS NOT EXAMINED]    Constitutional: [x] Appears well-developed and well-nourished [x] No apparent distress      [] Abnormal -     Mental status: [x] Alert and awake  [x] Oriented to person/place/time [x] Able to follow commands    [] Abnormal -     Eyes:   EOM    [x]  Normal    [] Abnormal -   Sclera  [x]  Normal    [] Abnormal -          Discharge [x]  None visible   [] Abnormal -     HENT: [x] Normocephalic, atraumatic  [] Abnormal -   [x] Mouth/Throat: Mucous membranes are moist    External Ears [x] Normal  [] Abnormal -    Neck: [x] No visualized mass [] Abnormal -     Pulmonary/Chest: [x] Respiratory effort normal   [x] No visualized signs of difficulty breathing or respiratory distress        [] Abnormal -      Musculoskeletal:   [x] Normal gait with no signs of ataxia         [x] Normal range of motion of neck        [] Abnormal -     Neurological:        [x] No Facial Asymmetry (Cranial nerve 7 motor function) (limited exam due to video visit) [x] No gaze palsy        [] Abnormal -          Skin:        [x] No significant exanthematous lesions or discoloration noted on facial skin         [] Abnormal -            Psychiatric:       [x] Normal Affect [] Abnormal -        [x] No Hallucinations    Other pertinent observable physical exam findings:-        We discussed the expected course, resolution and complications of the diagnosis(es) in detail. Medication risks, benefits, costs, interactions, and alternatives were discussed as indicated. I advised her to contact the office if her condition worsens, changes or fails to improve as anticipated. She expressed understanding with the diagnosis(es) and plan. Kindra Jean is a 42 y.o. female who was evaluated by a video visit encounter for concerns as above. Patient identification was verified prior to start of the visit. A caregiver was present when appropriate. Due to this being a TeleHealth encounter (During [facility]- public health emergency), evaluation of the following organ systems was limited: Vitals/Constitutional/EENT/Resp/CV/GI//MS/Neuro/Skin/Heme-Lymph-Imm. Pursuant to the emergency declaration under the [facility], 1135 waiver authority and the [employer] Act, this Virtual  Visit was conducted, with patient's (and/or legal guardian's) consent, to reduce the patient's risk of exposure to COVID-19 and provide necessary medical care. Services were provided through a video synchronous discussion virtually to substitute for in-person clinic visit. Patient and provider were located at their individual homes.       Clark Khan MD

## 2020-05-26 NOTE — PROGRESS NOTES
Chief Complaint   Patient presents with    Back Pain     1. Have you been to the ER, urgent care clinic since your last visit? Hospitalized since your last visit? Yes When: pt states couple weeks ago  Reason for visit: Shellfish allergic reaction    2. Have you seen or consulted any other health care providers outside of the [address] [place] [address] since your last visit? Include any pap smears or colon screening.  Yes Where: [facility]

## 2020-11-11 NOTE — PROGRESS NOTES
A user error has taken place:no services performed. Nova Ott LPN     This encounter was created in error - please disregard. This encounter was created in error - please disregard. This encounter was created in error - please disregard.

## 2022-03-19 PROBLEM — G89.29 CHRONIC BILATERAL LOW BACK PAIN WITH BILATERAL SCIATICA: Status: ACTIVE | Noted: 2020-04-07

## 2022-03-19 PROBLEM — M54.41 CHRONIC BILATERAL LOW BACK PAIN WITH BILATERAL SCIATICA: Status: ACTIVE | Noted: 2020-04-07

## 2022-03-19 PROBLEM — L60.9 NAIL ABNORMALITY: Status: ACTIVE | Noted: 2020-04-08

## 2022-03-19 PROBLEM — M54.42 CHRONIC BILATERAL LOW BACK PAIN WITH BILATERAL SCIATICA: Status: ACTIVE | Noted: 2020-04-07

## 2023-05-26 RX ORDER — GABAPENTIN 300 MG/1
CAPSULE ORAL
COMMUNITY
Start: 2020-05-26

## 2023-05-26 RX ORDER — CYCLOBENZAPRINE HCL 5 MG
TABLET ORAL
COMMUNITY
Start: 2020-05-26

## 2023-05-26 RX ORDER — NAPROXEN 500 MG/1
500 TABLET ORAL 2 TIMES DAILY WITH MEALS
COMMUNITY
Start: 2020-05-26

## 2024-03-25 ENCOUNTER — OFFICE VISIT (OUTPATIENT)
Facility: CLINIC | Age: 44
End: 2024-03-25
Payer: MEDICAID

## 2024-03-25 VITALS
HEIGHT: 67 IN | SYSTOLIC BLOOD PRESSURE: 114 MMHG | RESPIRATION RATE: 16 BRPM | WEIGHT: 185 LBS | HEART RATE: 58 BPM | TEMPERATURE: 98.7 F | DIASTOLIC BLOOD PRESSURE: 75 MMHG | BODY MASS INDEX: 29.03 KG/M2 | OXYGEN SATURATION: 99 %

## 2024-03-25 DIAGNOSIS — L50.9 URTICARIA: Primary | ICD-10-CM

## 2024-03-25 PROCEDURE — 99203 OFFICE O/P NEW LOW 30 MIN: CPT | Performed by: FAMILY MEDICINE

## 2024-03-25 RX ORDER — DIPHENHYDRAMINE HCL 25 MG
25 TABLET ORAL NIGHTLY PRN
COMMUNITY
Start: 2024-03-06 | End: 2024-03-25

## 2024-03-25 RX ORDER — EPINEPHRINE 0.3 MG/.3ML
INJECTION SUBCUTANEOUS
COMMUNITY
Start: 2020-04-27 | End: 2024-03-25 | Stop reason: SDUPTHER

## 2024-03-25 RX ORDER — EPINEPHRINE 0.3 MG/.3ML
0.3 INJECTION SUBCUTANEOUS ONCE AS NEEDED
Qty: 1 EACH | Refills: 1 | Status: SHIPPED | OUTPATIENT
Start: 2024-03-25

## 2024-03-25 RX ORDER — CETIRIZINE HYDROCHLORIDE 10 MG/1
10 TABLET ORAL 2 TIMES DAILY
COMMUNITY
Start: 2024-03-06 | End: 2024-03-25

## 2024-03-25 RX ORDER — TRIAMCINOLONE ACETONIDE 1 MG/G
OINTMENT TOPICAL 2 TIMES DAILY PRN
Qty: 80 G | Refills: 2 | Status: SHIPPED | OUTPATIENT
Start: 2024-03-25

## 2024-03-25 RX ORDER — PREDNISONE 10 MG/1
TABLET ORAL
COMMUNITY
Start: 2024-03-06 | End: 2024-03-25 | Stop reason: ALTCHOICE

## 2024-03-25 SDOH — ECONOMIC STABILITY: FOOD INSECURITY: WITHIN THE PAST 12 MONTHS, THE FOOD YOU BOUGHT JUST DIDN'T LAST AND YOU DIDN'T HAVE MONEY TO GET MORE.: NEVER TRUE

## 2024-03-25 SDOH — ECONOMIC STABILITY: HOUSING INSECURITY
IN THE LAST 12 MONTHS, WAS THERE A TIME WHEN YOU DID NOT HAVE A STEADY PLACE TO SLEEP OR SLEPT IN A SHELTER (INCLUDING NOW)?: NO

## 2024-03-25 SDOH — ECONOMIC STABILITY: INCOME INSECURITY: HOW HARD IS IT FOR YOU TO PAY FOR THE VERY BASICS LIKE FOOD, HOUSING, MEDICAL CARE, AND HEATING?: NOT HARD AT ALL

## 2024-03-25 SDOH — ECONOMIC STABILITY: FOOD INSECURITY: WITHIN THE PAST 12 MONTHS, YOU WORRIED THAT YOUR FOOD WOULD RUN OUT BEFORE YOU GOT MONEY TO BUY MORE.: NEVER TRUE

## 2024-03-25 ASSESSMENT — PATIENT HEALTH QUESTIONNAIRE - PHQ9
2. FEELING DOWN, DEPRESSED OR HOPELESS: NOT AT ALL
SUM OF ALL RESPONSES TO PHQ QUESTIONS 1-9: 0
SUM OF ALL RESPONSES TO PHQ QUESTIONS 1-9: 0
1. LITTLE INTEREST OR PLEASURE IN DOING THINGS: NOT AT ALL
SUM OF ALL RESPONSES TO PHQ9 QUESTIONS 1 & 2: 0
SUM OF ALL RESPONSES TO PHQ QUESTIONS 1-9: 0
SUM OF ALL RESPONSES TO PHQ QUESTIONS 1-9: 0

## 2024-03-25 NOTE — PROGRESS NOTES
Chief Complaint   Patient presents with    Establish Care     Patient is here to establish care.     Rash     Patient is here for a rash on body. Patient states she thinks it is a reaction to something she ate.      \"Have you been to the ER, urgent care clinic since your last visit?  Hospitalized since your last visit?\"    YES - When: approximately 3  weeks ago.  Where and Why: [facility] .    “Have you seen or consulted any other health care providers outside of [facility] since your last visit?”    YES - When: approximately 3  weeks ago.  Where and Why: [facility] .     “Have you had a pap smear?”    YES - Where: [facility] Nurse/CMA to request most recent records if not in the chart                 Click Here for Release of Records Request  
Housing in the Last Year: No        Review of Systems - Pertinent items are noted in HPI.    Objective:     Vitals:    03/25/24 1323   BP: 114/75   Pulse: 58   Resp: 16   Temp: 98.7 °F (37.1 °C)   SpO2: 99%   Weight: 83.9 kg (185 lb)   Height: 1.702 m (5' 7\")       Physical Examination: General Appearance:  awake, alert, oriented, in no acute distress  Skin:  diffuse papular rash over b/l arms, chest.  No urticaria at time of exam.    Lungs:  Normal expansion.  Clear to auscultation.  No rales, rhonchi, or wheezing.  Heart:  Heart sounds are normal.  Regular rate and rhythm without murmur, gallop or rub.  Psych: Appropriate mood and affect    Assessment/ Plan:   1. Urticaria  Comments:  Order in for allergy referral for full testing.  Avoid foods that worsen symptoms.  Triamcinolone sent to pharmacy for eczema flares.  Orders:  -     EPINEPHrine (EPIPEN) 0.3 MG/0.3ML SOAJ injection; Inject 0.3 mLs into the muscle 1 (one) time if needed (allergic reaction) Use as directed for allergic reaction, Disp-1 each, R-1Normal  -     triamcinolone (KENALOG) 0.1 % ointment; Apply topically 2 times daily as needed (eczema), Topical, 2 TIMES DAILY PRN Starting Mon 3/25/2024, Disp-80 g, R-2, Normal  -     [facility]       Return in about 8 months (around 11/25/2024) for yearly physical with Pap.      Medication Side Effects and Warnings were discussed with patient,  Patient Labs were reviewed and or requested, and  Patient Past Records were reviewed and or requested  Yes       I have discussed the diagnosis with the patient and the intended plan as seen in the above orders.  The patient has received an after-visit summary and questions were answered concerning future plans.     Please note that this dictation was completed with [employer], the [employer] voice recognition software.  Quite often unanticipated grammatical, syntax, homophones, and other interpretive errors are

## 2024-04-23 ENCOUNTER — OFFICE VISIT (OUTPATIENT)
Facility: CLINIC | Age: 44
End: 2024-04-23
Payer: MEDICAID

## 2024-04-23 VITALS
HEIGHT: 67 IN | RESPIRATION RATE: 18 BRPM | OXYGEN SATURATION: 100 % | TEMPERATURE: 99.1 F | BODY MASS INDEX: 29.18 KG/M2 | WEIGHT: 185.9 LBS | DIASTOLIC BLOOD PRESSURE: 76 MMHG | SYSTOLIC BLOOD PRESSURE: 121 MMHG | HEART RATE: 63 BPM

## 2024-04-23 DIAGNOSIS — N76.0 ACUTE VAGINITIS: Primary | ICD-10-CM

## 2024-04-23 PROCEDURE — 99213 OFFICE O/P EST LOW 20 MIN: CPT | Performed by: FAMILY MEDICINE

## 2024-04-23 RX ORDER — FLUCONAZOLE 150 MG/1
150 TABLET ORAL
Qty: 2 TABLET | Refills: 0 | Status: SHIPPED | OUTPATIENT
Start: 2024-04-23 | End: 2024-04-29

## 2024-04-23 RX ORDER — METRONIDAZOLE 500 MG/1
500 TABLET ORAL 2 TIMES DAILY
Qty: 14 TABLET | Refills: 0 | Status: SHIPPED | OUTPATIENT
Start: 2024-04-23 | End: 2024-04-30

## 2024-04-23 SDOH — ECONOMIC STABILITY: FOOD INSECURITY: WITHIN THE PAST 12 MONTHS, YOU WORRIED THAT YOUR FOOD WOULD RUN OUT BEFORE YOU GOT MONEY TO BUY MORE.: NEVER TRUE

## 2024-04-23 SDOH — ECONOMIC STABILITY: INCOME INSECURITY: HOW HARD IS IT FOR YOU TO PAY FOR THE VERY BASICS LIKE FOOD, HOUSING, MEDICAL CARE, AND HEATING?: NOT HARD AT ALL

## 2024-04-23 SDOH — ECONOMIC STABILITY: FOOD INSECURITY: WITHIN THE PAST 12 MONTHS, THE FOOD YOU BOUGHT JUST DIDN'T LAST AND YOU DIDN'T HAVE MONEY TO GET MORE.: NEVER TRUE

## 2024-04-23 ASSESSMENT — PATIENT HEALTH QUESTIONNAIRE - PHQ9
2. FEELING DOWN, DEPRESSED OR HOPELESS: NOT AT ALL
SUM OF ALL RESPONSES TO PHQ QUESTIONS 1-9: 0
1. LITTLE INTEREST OR PLEASURE IN DOING THINGS: NOT AT ALL
SUM OF ALL RESPONSES TO PHQ QUESTIONS 1-9: 0
SUM OF ALL RESPONSES TO PHQ9 QUESTIONS 1 & 2: 0
SUM OF ALL RESPONSES TO PHQ QUESTIONS 1-9: 0
SUM OF ALL RESPONSES TO PHQ QUESTIONS 1-9: 0

## 2024-04-23 NOTE — PROGRESS NOTES
Chief Complaint   Patient presents with    Other     Possible bacterial infection.     \"Have you been to the ER, urgent care clinic since your last visit?  Hospitalized since your last visit?\"    NO    “Have you seen or consulted any other health care providers outside of [facility] since your last visit?”    NO     “Have you had a pap smear?”    NO    No cervical cancer screening on file             Click Here for Release of Records Request   
Stability Vital Sign     Unstable Housing in the Last Year: No        Review of Systems - Pertinent items are noted in HPI.    Objective:     Vitals:    04/23/24 1445   BP: 121/76   Site: Right Upper Arm   Position: Sitting   Pulse: 63   Resp: 18   Temp: 99.1 °F (37.3 °C)   TempSrc: Oral   SpO2: 100%   Weight: 84.3 kg (185 lb 14.4 oz)   Height: 1.702 m (5' 7\")       Physical Examination: General Appearance:  awake, alert, oriented, in no acute distress  Female Urogen:  External genitalia: normal  Vagina: discharge: white and malodorous    Assessment/ Plan:   1. Acute vaginitis  Comments:  An issue.  Flagyl sent to pharmacy, advised to use boric acid suppositories as well.  Referral in for GYN if ongoing problems.  Orders:  -     [facility] - Tobi Burns MD, [facility] ([facility])  -     Bacterial Vaginosis Panel; Future  -     Candida Vaginitis and Trichomonas Vaginalis Amplification; Future  -     metroNIDAZOLE (FLAGYL) 500 MG tablet; Take 1 tablet by mouth 2 times daily for 7 days, Disp-14 tablet, R-0Normal  -     fluconazole (DIFLUCAN) 150 MG tablet; Take 1 tablet by mouth every 72 hours for 6 days, Disp-2 tablet, R-0Normal       No follow-ups on file.      Medication Side Effects and Warnings were discussed with patient,  Patient Labs were reviewed and or requested, and  Patient Past Records were reviewed and or requested  Yes       I have discussed the diagnosis with the patient and the intended plan as seen in the above orders.  The patient has received an after-visit summary and questions were answered concerning future plans.     Please note that this dictation was completed with [employer], the [employer] voice recognition software.  Quite often unanticipated grammatical, syntax, homophones, and other interpretive errors are inadvertently transcribed by the computer software.  Please disregard these errors.  Please excuse any errors that have escaped final proofreading.  Thank you.

## 2024-04-27 LAB
A VAGINAE DNA VAG QL NAA+PROBE: ABNORMAL SCORE
BVAB2 DNA VAG QL NAA+PROBE: ABNORMAL SCORE
MEGA1 DNA VAG QL NAA+PROBE: ABNORMAL SCORE
SPECIMEN SOURCE: ABNORMAL

## 2024-05-15 ENCOUNTER — TELEPHONE (OUTPATIENT)
Facility: CLINIC | Age: 44
End: 2024-05-15

## 2024-05-15 RX ORDER — METRONIDAZOLE 500 MG/1
500 TABLET ORAL 2 TIMES DAILY
Qty: 14 TABLET | Refills: 0 | Status: SHIPPED | OUTPATIENT
Start: 2024-05-15 | End: 2024-05-22

## 2024-05-15 NOTE — TELEPHONE ENCOUNTER
Patient called stating she has the bacterial infection back full force that you treated her for and she wants treatment and the appt to a gyn that you say you will refer her to. [phone]